# Patient Record
Sex: MALE | Race: BLACK OR AFRICAN AMERICAN | NOT HISPANIC OR LATINO | URBAN - NONMETROPOLITAN AREA
[De-identification: names, ages, dates, MRNs, and addresses within clinical notes are randomized per-mention and may not be internally consistent; named-entity substitution may affect disease eponyms.]

---

## 2021-04-26 ENCOUNTER — CLAIMS CREATED FROM THE CLAIM WINDOW (OUTPATIENT)
Dept: URBAN - NONMETROPOLITAN AREA CLINIC 2 | Facility: CLINIC | Age: 66
End: 2021-04-26
Payer: MEDICARE

## 2021-04-26 ENCOUNTER — LAB OUTSIDE AN ENCOUNTER (OUTPATIENT)
Dept: URBAN - NONMETROPOLITAN AREA CLINIC 2 | Facility: CLINIC | Age: 66
End: 2021-04-26

## 2021-04-26 VITALS
SYSTOLIC BLOOD PRESSURE: 152 MMHG | HEIGHT: 67 IN | DIASTOLIC BLOOD PRESSURE: 77 MMHG | BODY MASS INDEX: 26.46 KG/M2 | HEART RATE: 81 BPM | TEMPERATURE: 97.2 F | WEIGHT: 168.6 LBS

## 2021-04-26 DIAGNOSIS — D69.6 THROMBOCYTOPENIA: ICD-10-CM

## 2021-04-26 DIAGNOSIS — R18.8 ASCITES OF LIVER: ICD-10-CM

## 2021-04-26 DIAGNOSIS — Z12.11 COLON CANCER SCREENING: ICD-10-CM

## 2021-04-26 DIAGNOSIS — D64.9 ANEMIA, UNSPECIFIED TYPE: ICD-10-CM

## 2021-04-26 PROCEDURE — 99204 OFFICE O/P NEW MOD 45 MIN: CPT | Performed by: INTERNAL MEDICINE

## 2021-04-26 PROCEDURE — 99244 OFF/OP CNSLTJ NEW/EST MOD 40: CPT | Performed by: INTERNAL MEDICINE

## 2021-04-26 NOTE — HPI-TODAY'S VISIT:
Mr. Clark is referred to us for consultation of Ascites by Dr. Jose, a copy of this note will be sent to the referring MD. He developed ascites in December of unknown etiology, no serum or serologic albumin was drawn to check a Saag. I can't tell why this was ordered but likely from previous nephrologist (not Dr. Jose). He does drink occasionally 1-2 beers a month socially, not on a daily or weekly basis per his report. He has no history of IVD use or unprofessional tatoos. he has had negative hepatitis B/C testing with HD. He has no history of liver disease in his family, no jaunidce or hepatitis as a kid. He was noted on labs done with the paracentesis to have low platelets as well. He has not had any abdominal imaging. He has chronic anemia on HD due to ESRD from HTN. He had a colonoscopy and possible EGD done 3 years ago by Dr. Cabezas. He does not recall the results, we don't have these records. Today he is doing well otherwise and here to establish care. MB

## 2021-04-29 LAB
A/G RATIO: 1.1
ACTIN (SMOOTH MUSCLE) ANTIBODY: 19
ALBUMIN: 4
ALKALINE PHOSPHATASE: 176
ALPHA-1-ANTITRYPSIN, SERUM: 186
ALT (SGPT): 12
ANA DIRECT: NEGATIVE
AST (SGOT): 16
BASO (ABSOLUTE): 0
BASOS: 1
BILIRUBIN, TOTAL: 0.9
BUN/CREATININE RATIO: 4
BUN: 30
CALCIUM: 9.2
CARBON DIOXIDE, TOTAL: 24
CHLORIDE: 93
CREATININE: 8.43
EGFR IF AFRICN AM: 7
EGFR IF NONAFRICN AM: 6
ENDOMYSIAL ANTIBODY IGA: NEGATIVE
EOS (ABSOLUTE): 0
EOS: 2
FERRITIN, SERUM: 1762
GGT: 341
GLOBULIN, TOTAL: 3.7
GLUCOSE: 81
HBSAG SCREEN: NEGATIVE
HEMATOCRIT: 31.6
HEMATOLOGY COMMENTS:: (no result)
HEMOGLOBIN: 10.2
HEP A AB, IGM: NEGATIVE
HEP B CORE AB, IGM: NEGATIVE
HEP C VIRUS AB: <0.1
IMMATURE CELLS: (no result)
IMMATURE GRANS (ABS): 0
IMMATURE GRANULOCYTES: 1
IMMUNOGLOBULIN A, QN, SERUM: 496
IRON BIND.CAP.(TIBC): 232
IRON SATURATION: 37
IRON: 85
LYMPHS (ABSOLUTE): 0.5
LYMPHS: 25
MCH: 29.7
MCHC: 32.3
MCV: 92
MITOCHONDRIAL (M2) ANTIBODY: <20
MONOCYTES(ABSOLUTE): 0.3
MONOCYTES: 13
NEUTROPHILS (ABSOLUTE): 1.3
NEUTROPHILS: 58
NRBC: (no result)
PLATELETS: 103
POTASSIUM: 4.3
PROTEIN, TOTAL: 7.7
RBC: 3.44
RDW: 14.5
SODIUM: 137
T-TRANSGLUTAMINASE (TTG) IGA: <2
TSH: 3.48
UIBC: 147
WBC: 2.2

## 2021-05-03 ENCOUNTER — TELEPHONE ENCOUNTER (OUTPATIENT)
Dept: URBAN - METROPOLITAN AREA CLINIC 92 | Facility: CLINIC | Age: 66
End: 2021-05-03

## 2021-05-13 ENCOUNTER — TELEPHONE ENCOUNTER (OUTPATIENT)
Dept: URBAN - METROPOLITAN AREA CLINIC 92 | Facility: CLINIC | Age: 66
End: 2021-05-13

## 2021-06-03 LAB — HEREDITARY  HEMOCHROMATOSIS: (no result)

## 2021-07-26 ENCOUNTER — WEB ENCOUNTER (OUTPATIENT)
Dept: URBAN - NONMETROPOLITAN AREA CLINIC 2 | Facility: CLINIC | Age: 66
End: 2021-07-26

## 2021-07-26 ENCOUNTER — LAB OUTSIDE AN ENCOUNTER (OUTPATIENT)
Dept: URBAN - NONMETROPOLITAN AREA CLINIC 2 | Facility: CLINIC | Age: 66
End: 2021-07-26

## 2021-07-26 ENCOUNTER — OFFICE VISIT (OUTPATIENT)
Dept: URBAN - NONMETROPOLITAN AREA CLINIC 2 | Facility: CLINIC | Age: 66
End: 2021-07-26
Payer: MEDICARE

## 2021-07-26 VITALS
BODY MASS INDEX: 25.9 KG/M2 | SYSTOLIC BLOOD PRESSURE: 153 MMHG | DIASTOLIC BLOOD PRESSURE: 79 MMHG | HEART RATE: 86 BPM | HEIGHT: 67 IN | TEMPERATURE: 97.6 F | WEIGHT: 165 LBS

## 2021-07-26 DIAGNOSIS — Z12.11 COLON CANCER SCREENING: ICD-10-CM

## 2021-07-26 DIAGNOSIS — D64.89 ANEMIA DUE TO OTHER CAUSE: ICD-10-CM

## 2021-07-26 DIAGNOSIS — R79.89 ELEVATED FERRITIN: ICD-10-CM

## 2021-07-26 DIAGNOSIS — D69.6 THROMBOCYTOPENIA: ICD-10-CM

## 2021-07-26 DIAGNOSIS — K74.69 CIRRHOSIS, CRYPTOGENIC: ICD-10-CM

## 2021-07-26 DIAGNOSIS — R18.8 ASCITES OF LIVER: ICD-10-CM

## 2021-07-26 PROCEDURE — 99214 OFFICE O/P EST MOD 30 MIN: CPT | Performed by: INTERNAL MEDICINE

## 2021-07-26 NOTE — HPI-TODAY'S VISIT:
Mr. Clark is referred to us for consultation of Ascites by Dr. Jose, a copy of this note will be sent to the referring MD. He developed ascites in December of unknown etiology, no serum or serologic albumin was drawn to check a Saag. I can't tell why this was ordered but likely from previous nephrologist (not Dr. Jose). He does drink occasionally 1-2 beers a month socially, not on a daily or weekly basis per his report. He has no history of IVD use or unprofessional tatoos. he has had negative hepatitis B/C testing with HD. He has no history of liver disease in his family, no jaunidce or hepatitis as a kid. He was noted on labs done with the paracentesis to have low platelets as well. He has not had any abdominal imaging. He has chronic anemia on HD due to ESRD from HTN. He had a colonoscopy and possible EGD done 3 years ago by Dr. Cabezas. He does not recall the results, we don't have these records. Today he is doing well otherwise and here to establish care. MB 7/26/2021 Mr. Clark presents for follow-up of ascites and a new diagnosis of cirrhosis.  Since his last visit his ultrasound does reveal an cirrhotic appearing liver.  His chronic serologies are normal except for an elevated ferritin.  He does remain on peritoneal dialysis.  His last EGD and colonoscopy were done over 3 years ago by Dr. Cabezas, we were unable to get these results.  He does have pancytopenia with mild anemia leukopenia, along with thrombocytopenia.  He is following with Dr. Issa on peritoneal dialysis with no significant accumulation of his ascites.  Today we had a long discussion regarding liver disease, he is not a drinker.  He does agree to EGD and colonoscopy for evaluation of his anemia.  All of his questions were answered.  MB

## 2021-07-30 LAB
A/G RATIO: 1.3
ALBUMIN: 4
ALKALINE PHOSPHATASE: 171
ALT (SGPT): 10
AST (SGOT): 15
BANDS: (no result)
BASO (ABSOLUTE): 0
BASOS: 1
BILIRUBIN, TOTAL: 0.9
BLASTS/BLAST LIKE CELLS: (no result)
BUN/CREATININE RATIO: 4
BUN: 35
CALCIUM: 9.2
CARBON DIOXIDE, TOTAL: 26
CHLORIDE: 95
CREATININE: 9.08
EGFR IF AFRICN AM: 6
EGFR IF NONAFRICN AM: 5
EOS (ABSOLUTE): 0.1
EOS: 4
GLOBULIN, TOTAL: 3
GLUCOSE: 93
HEMATOCRIT: 28.7
HEMATOLOGY COMMENTS:: (no result)
HEMOGLOBIN: 9.8
HEREDITARY  HEMOCHROMATOSIS: (no result)
IMMATURE CELLS: (no result)
IMMATURE GRANS (ABS): (no result)
IMMATURE GRANULOCYTES: (no result)
INR: 1.1
LYMPHS (ABSOLUTE): 0.4
LYMPHS: 19
MCH: 30.5
MCHC: 34.1
MCV: 89
MEGAKARYOCYTES: (no result)
METAMYELOCYTES: (no result)
MONOCYTES(ABSOLUTE): 0.3
MONOCYTES: 13
MYELOCYTES: 1
NEUTROPHILS (ABSOLUTE): 1.2
NEUTROPHILS: 62
NRBC: (no result)
OTHER, LINEAGE UNCERTAIN: (no result)
PLATELETS: 79
POTASSIUM: 4.2
PROMYELOCYTES: (no result)
PROTEIN, TOTAL: 7
PROTHROMBIN TIME: 11.3
RBC: 3.21
RDW: 14
SODIUM: 137
WBC: 2

## 2021-09-09 ENCOUNTER — OFFICE VISIT (OUTPATIENT)
Dept: URBAN - METROPOLITAN AREA MEDICAL CENTER 1 | Facility: MEDICAL CENTER | Age: 66
End: 2021-09-09
Payer: MEDICARE

## 2021-09-09 DIAGNOSIS — K29.60 ADENOPAPILLOMATOSIS GASTRICA: ICD-10-CM

## 2021-09-09 DIAGNOSIS — D12.5 ADENOMA OF SIGMOID COLON: ICD-10-CM

## 2021-09-09 DIAGNOSIS — D50.9 ANEMIA: ICD-10-CM

## 2021-09-09 DIAGNOSIS — D12.2 ADENOMA OF ASCENDING COLON: ICD-10-CM

## 2021-09-09 DIAGNOSIS — D12.0 ADENOMA OF CECUM: ICD-10-CM

## 2021-09-09 DIAGNOSIS — K74.69 CIRRHOSIS, CRYPTOGENIC: ICD-10-CM

## 2021-09-09 PROCEDURE — 45380 COLONOSCOPY AND BIOPSY: CPT | Performed by: INTERNAL MEDICINE

## 2021-09-09 PROCEDURE — 43239 EGD BIOPSY SINGLE/MULTIPLE: CPT | Performed by: INTERNAL MEDICINE

## 2021-09-09 PROCEDURE — 45385 COLONOSCOPY W/LESION REMOVAL: CPT | Performed by: INTERNAL MEDICINE

## 2021-10-14 ENCOUNTER — OFFICE VISIT (OUTPATIENT)
Dept: URBAN - NONMETROPOLITAN AREA CLINIC 2 | Facility: CLINIC | Age: 66
End: 2021-10-14
Payer: MEDICARE

## 2021-10-14 ENCOUNTER — LAB OUTSIDE AN ENCOUNTER (OUTPATIENT)
Dept: URBAN - NONMETROPOLITAN AREA CLINIC 2 | Facility: CLINIC | Age: 66
End: 2021-10-14

## 2021-10-14 VITALS
TEMPERATURE: 97.7 F | WEIGHT: 165.2 LBS | BODY MASS INDEX: 25.93 KG/M2 | HEART RATE: 89 BPM | HEIGHT: 67 IN | SYSTOLIC BLOOD PRESSURE: 162 MMHG | DIASTOLIC BLOOD PRESSURE: 80 MMHG

## 2021-10-14 DIAGNOSIS — D64.9 ANEMIA, UNSPECIFIED TYPE: ICD-10-CM

## 2021-10-14 DIAGNOSIS — R18.8 ASCITES OF LIVER: ICD-10-CM

## 2021-10-14 DIAGNOSIS — Z12.11 COLON CANCER SCREENING: ICD-10-CM

## 2021-10-14 DIAGNOSIS — R79.89 ELEVATED FERRITIN: ICD-10-CM

## 2021-10-14 DIAGNOSIS — K74.60 UNSPECIFIED CIRRHOSIS OF LIVER: ICD-10-CM

## 2021-10-14 DIAGNOSIS — D69.6 THROMBOCYTOPENIA: ICD-10-CM

## 2021-10-14 PROCEDURE — 99214 OFFICE O/P EST MOD 30 MIN: CPT | Performed by: NURSE PRACTITIONER

## 2021-10-14 NOTE — HPI-TODAY'S VISIT:
Mr. Clark is referred to us for consultation of Ascites by Dr. Jose, a copy of this note will be sent to the referring MD. He developed ascites in December of unknown etiology, no serum or serologic albumin was drawn to check a Saag. I can't tell why this was ordered but likely from previous nephrologist (not Dr. Jose). He does drink occasionally 1-2 beers a month socially, not on a daily or weekly basis per his report. He has no history of IVD use or unprofessional tatoos. he has had negative hepatitis B/C testing with HD. He has no history of liver disease in his family, no jaunidce or hepatitis as a kid. He was noted on labs done with the paracentesis to have low platelets as well. He has not had any abdominal imaging. He has chronic anemia on HD due to ESRD from HTN. He had a colonoscopy and possible EGD done 3 years ago by Dr. Cabezas. He does not recall the results, we don't have these records. Today he is doing well otherwise and here to establish care. MB 7/26/2021 Mr. Clark presents for follow-up of ascites and a new diagnosis of cirrhosis.  Since his last visit his ultrasound does reveal an cirrhotic appearing liver.  His chronic serologies are normal except for an elevated ferritin.  He does remain on peritoneal dialysis.  His last EGD and colonoscopy were done over 3 years ago by Dr. Cabezas, we were unable to get these results.  He does have pancytopenia with mild anemia leukopenia, along with thrombocytopenia.  He is following with Dr. Issa on peritoneal dialysis with no significant accumulation of his ascites.  Today we had a long discussion regarding liver disease, he is not a drinker.  He does agree to EGD and colonoscopy for evaluation of his anemia.  All of his questions were answered.  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 Since his last visit he is status post EGD with no esophageal varices, his colonoscopy revealed multiple large TA/TVA's. He is due for repeat colonoscopy in 1 year. His blood work does show mild anemia which is stable on peritoneal dialysis. His ALP is mildly elevated also likely secondary to his end-stage renal disease. Today he has no GI complaints, he is tolerating his peritoneal dialysis daily. His meld is low, and he is doing well today. MB 10/14/2021 Ben presents for endoscopy and colonoscopy follow-up. His EGD reveals no esophageal varices or portal hypertension. His colonoscopy reveals multiple tubular adenomas and tubulovillous adenomas. He is due for repeat surveillance colonoscopy in 1 year. His bowels are moving regularly, he has no symptoms of hepatic encephalopathy. His labs show normocytic anemia secondary to his end-stage renal disease along with a mildly elevated alkaline phosphatase. He is due for repeat HCC screening, he has a low meld. Today he is doing well otherwise with no new GI complaints. MB

## 2021-10-15 ENCOUNTER — TELEPHONE ENCOUNTER (OUTPATIENT)
Dept: URBAN - METROPOLITAN AREA CLINIC 92 | Facility: CLINIC | Age: 66
End: 2021-10-15

## 2021-10-15 LAB
A/G RATIO: 1.2
AFP, SERUM, TUMOR MARKER: 3.1
ALBUMIN: 4.4
ALKALINE PHOSPHATASE: 197
ALT (SGPT): 15
AST (SGOT): 21
BASO (ABSOLUTE): 0
BASOS: 1
BILIRUBIN, TOTAL: 1.1
BUN/CREATININE RATIO: 2
BUN: 11
CALCIUM: 9.2
CARBON DIOXIDE, TOTAL: 32
CHLORIDE: 94
CREATININE: 4.63
EGFR IF AFRICN AM: 14
EGFR IF NONAFRICN AM: 12
EOS (ABSOLUTE): 0
EOS: 2
GLOBULIN, TOTAL: 3.6
GLUCOSE: 84
HEMATOCRIT: 31.4
HEMATOLOGY COMMENTS:: (no result)
HEMOGLOBIN: 10.3
IMMATURE CELLS: (no result)
IMMATURE GRANS (ABS): 0
IMMATURE GRANULOCYTES: 1
INR: 1.1
LYMPHS (ABSOLUTE): 0.4
LYMPHS: 19
MCH: 30.1
MCHC: 32.8
MCV: 92
MONOCYTES(ABSOLUTE): 0.4
MONOCYTES: 18
NEUTROPHILS (ABSOLUTE): 1.1
NEUTROPHILS: 59
NRBC: (no result)
PLATELETS: 114
POTASSIUM: 3.8
PROTEIN, TOTAL: 8
PROTHROMBIN TIME: 11.2
RBC: 3.42
RDW: 14.4
SODIUM: 139
WBC: 1.9

## 2021-10-18 ENCOUNTER — TELEPHONE ENCOUNTER (OUTPATIENT)
Dept: URBAN - NONMETROPOLITAN AREA CLINIC 2 | Facility: CLINIC | Age: 66
End: 2021-10-18

## 2021-11-10 ENCOUNTER — TELEPHONE ENCOUNTER (OUTPATIENT)
Dept: URBAN - METROPOLITAN AREA CLINIC 92 | Facility: CLINIC | Age: 66
End: 2021-11-10

## 2021-11-10 ENCOUNTER — LAB OUTSIDE AN ENCOUNTER (OUTPATIENT)
Dept: URBAN - METROPOLITAN AREA CLINIC 92 | Facility: CLINIC | Age: 66
End: 2021-11-10

## 2021-12-09 ENCOUNTER — TELEPHONE ENCOUNTER (OUTPATIENT)
Dept: URBAN - METROPOLITAN AREA CLINIC 92 | Facility: CLINIC | Age: 66
End: 2021-12-09

## 2022-04-14 ENCOUNTER — OFFICE VISIT (OUTPATIENT)
Dept: URBAN - NONMETROPOLITAN AREA CLINIC 2 | Facility: CLINIC | Age: 67
End: 2022-04-14

## 2022-05-16 ENCOUNTER — TELEPHONE ENCOUNTER (OUTPATIENT)
Dept: URBAN - NONMETROPOLITAN AREA CLINIC 2 | Facility: CLINIC | Age: 67
End: 2022-05-16

## 2022-05-16 ENCOUNTER — LAB OUTSIDE AN ENCOUNTER (OUTPATIENT)
Dept: URBAN - NONMETROPOLITAN AREA CLINIC 2 | Facility: CLINIC | Age: 67
End: 2022-05-16

## 2022-05-16 ENCOUNTER — OFFICE VISIT (OUTPATIENT)
Dept: URBAN - NONMETROPOLITAN AREA CLINIC 2 | Facility: CLINIC | Age: 67
End: 2022-05-16
Payer: MEDICARE

## 2022-05-16 VITALS
BODY MASS INDEX: 27.69 KG/M2 | WEIGHT: 176.4 LBS | HEART RATE: 97.6 BPM | RESPIRATION RATE: 85 BRPM | SYSTOLIC BLOOD PRESSURE: 149 MMHG | HEIGHT: 67 IN | DIASTOLIC BLOOD PRESSURE: 79 MMHG | TEMPERATURE: 97.5 F

## 2022-05-16 DIAGNOSIS — R18.8 ASCITES OF LIVER: ICD-10-CM

## 2022-05-16 DIAGNOSIS — K82.8 THICKENING OF WALL OF GALLBLADDER: ICD-10-CM

## 2022-05-16 DIAGNOSIS — D69.6 THROMBOCYTOPENIA: ICD-10-CM

## 2022-05-16 DIAGNOSIS — D64.9 ANEMIA, UNSPECIFIED TYPE: ICD-10-CM

## 2022-05-16 DIAGNOSIS — R59.1 LYMPHADENOPATHY: ICD-10-CM

## 2022-05-16 DIAGNOSIS — Z12.11 COLON CANCER SCREENING: ICD-10-CM

## 2022-05-16 DIAGNOSIS — D64.89 ANEMIA DUE TO OTHER CAUSE: ICD-10-CM

## 2022-05-16 DIAGNOSIS — K74.69 CIRRHOSIS, CRYPTOGENIC: ICD-10-CM

## 2022-05-16 DIAGNOSIS — D61.818 PANCYTOPENIA: ICD-10-CM

## 2022-05-16 DIAGNOSIS — R79.89 ELEVATED FERRITIN: ICD-10-CM

## 2022-05-16 PROCEDURE — 99214 OFFICE O/P EST MOD 30 MIN: CPT | Performed by: NURSE PRACTITIONER

## 2022-05-16 NOTE — HPI-TODAY'S VISIT:
Mr. Clark is referred to us for consultation of Ascites by Dr. Jose, a copy of this note will be sent to the referring MD. He developed ascites in December of unknown etiology, no serum or serologic albumin was drawn to check a Saag. I can't tell why this was ordered but likely from previous nephrologist (not Dr. Jose). He does drink occasionally 1-2 beers a month socially, not on a daily or weekly basis per his report. He has no history of IVD use or unprofessional tatoos. he has had negative hepatitis B/C testing with HD. He has no history of liver disease in his family, no jaunidce or hepatitis as a kid. He was noted on labs done with the paracentesis to have low platelets as well. He has not had any abdominal imaging. He has chronic anemia on HD due to ESRD from HTN. He had a colonoscopy and possible EGD done 3 years ago by Dr. Cabezas. He does not recall the results, we don't have these records. Today he is doing well otherwise and here to establish care. MB 7/26/2021 Mr. Clark presents for follow-up of ascites and a new diagnosis of cirrhosis.  Since his last visit his ultrasound does reveal an cirrhotic appearing liver.  His chronic serologies are normal except for an elevated ferritin.  He does remain on peritoneal dialysis.  His last EGD and colonoscopy were done over 3 years ago by Dr. Cabezas, we were unable to get these results.  He does have pancytopenia with mild anemia leukopenia, along with thrombocytopenia.  He is following with Dr. Issa on peritoneal dialysis with no significant accumulation of his ascites.  Today we had a long discussion regarding liver disease, he is not a drinker.  He does agree to EGD and colonoscopy for evaluation of his anemia.  All of his questions were answered.  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 Since his last visit he is status post EGD with no esophageal varices, his colonoscopy revealed multiple large TA/TVA's. He is due for repeat colonoscopy in 1 year. His blood work does show mild anemia which is stable on peritoneal dialysis. His ALP is mildly elevated also likely secondary to his end-stage renal disease. Today he has no GI complaints, he is tolerating his peritoneal dialysis daily. His meld is low, and he is doing well today. MB 10/14/2021 Ben presents for endoscopy and colonoscopy follow-up. His EGD reveals no esophageal varices or portal hypertension. His colonoscopy reveals multiple tubular adenomas and tubulovillous adenomas. He is due for repeat surveillance colonoscopy in 1 year. His bowels are moving regularly, he has no symptoms of hepatic encephalopathy. His labs show normocytic anemia secondary to his end-stage renal disease along with a mildly elevated alkaline phosphatase. He is due for repeat HCC screening, he has a low meld. Today he is doing well otherwise with no new GI complaints. 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  Since his last visit his ultrasound showed gallbladder wall thickening and periportal edema with a Jonah pancreatic lymph node.  He had an MRCP that was normal.  He has been stable with his peritoneal dialysis following with Dr. Issa.  His last colonoscopy was in September 2021 with multiple large tubular adenoma/tubulovillous adenomas by Dr. Omalley.  He is due for repeat this fall.  He denies any nausea vomiting or melena.  He does have pancytopenia likely secondary to his end-stage renal disease and end-stage liver disease.  He is due for repeat labs today.  Today he has no new GI complaints.  He agrees to repeat colonoscopy this fall.  MB

## 2022-05-17 LAB
A/G RATIO: 1.1
ALBUMIN: 4
ALKALINE PHOSPHATASE: 163
ALT (SGPT): 9
AST (SGOT): 18
BASO (ABSOLUTE): 0.1
BASOS: 3
BILIRUBIN, TOTAL: 0.9
BUN/CREATININE RATIO: 4
BUN: 35
CALCIUM: 9.1
CARBON DIOXIDE, TOTAL: 28
CHLORIDE: 92
CREATININE: 8.29
EGFR: 7
EOS (ABSOLUTE): 0
EOS: 2
GLOBULIN, TOTAL: 3.5
GLUCOSE: 76
HEMATOCRIT: 32.7
HEMATOLOGY COMMENTS:: (no result)
HEMOGLOBIN: 10.8
IMMATURE CELLS: (no result)
IMMATURE GRANS (ABS): (no result)
IMMATURE GRANULOCYTES: (no result)
INR: 1.1
LYMPHS (ABSOLUTE): 0.4
LYMPHS: 23
MCH: 30.2
MCHC: 33
MCV: 91
MONOCYTES(ABSOLUTE): 0.3
MONOCYTES: 15
NEUTROPHILS (ABSOLUTE): 1.1
NEUTROPHILS: 57
NRBC: (no result)
PLATELETS: 95
POTASSIUM: 4.3
PROTEIN, TOTAL: 7.5
PROTHROMBIN TIME: 11.2
RBC: 3.58
RDW: 14.2
SODIUM: 135
WBC: 1.9

## 2022-05-18 ENCOUNTER — TELEPHONE ENCOUNTER (OUTPATIENT)
Dept: URBAN - METROPOLITAN AREA CLINIC 92 | Facility: CLINIC | Age: 67
End: 2022-05-18

## 2022-09-12 ENCOUNTER — OFFICE VISIT (OUTPATIENT)
Dept: URBAN - METROPOLITAN AREA MEDICAL CENTER 1 | Facility: MEDICAL CENTER | Age: 67
End: 2022-09-12
Payer: MEDICARE

## 2022-09-12 DIAGNOSIS — Z86.010 ADENOMAS PERSONAL HISTORY OF COLONIC POLYPS: ICD-10-CM

## 2022-09-12 PROCEDURE — G0105 COLORECTAL SCRN; HI RISK IND: HCPCS | Performed by: INTERNAL MEDICINE

## 2022-10-10 ENCOUNTER — DASHBOARD ENCOUNTERS (OUTPATIENT)
Age: 67
End: 2022-10-10

## 2022-10-10 ENCOUNTER — OFFICE VISIT (OUTPATIENT)
Dept: URBAN - NONMETROPOLITAN AREA CLINIC 2 | Facility: CLINIC | Age: 67
End: 2022-10-10
Payer: MEDICARE

## 2022-10-10 ENCOUNTER — LAB OUTSIDE AN ENCOUNTER (OUTPATIENT)
Dept: URBAN - NONMETROPOLITAN AREA CLINIC 2 | Facility: CLINIC | Age: 67
End: 2022-10-10

## 2022-10-10 VITALS
HEIGHT: 67 IN | WEIGHT: 177.2 LBS | SYSTOLIC BLOOD PRESSURE: 167 MMHG | BODY MASS INDEX: 27.81 KG/M2 | HEART RATE: 96 BPM | DIASTOLIC BLOOD PRESSURE: 88 MMHG | TEMPERATURE: 97.5 F

## 2022-10-10 DIAGNOSIS — R59.1 LYMPHADENOPATHY: ICD-10-CM

## 2022-10-10 DIAGNOSIS — D69.6 THROMBOCYTOPENIA: ICD-10-CM

## 2022-10-10 DIAGNOSIS — K74.69 CIRRHOSIS, CRYPTOGENIC: ICD-10-CM

## 2022-10-10 DIAGNOSIS — Z12.11 COLON CANCER SCREENING: ICD-10-CM

## 2022-10-10 DIAGNOSIS — D61.818 PANCYTOPENIA: ICD-10-CM

## 2022-10-10 DIAGNOSIS — R18.8 ASCITES OF LIVER: ICD-10-CM

## 2022-10-10 DIAGNOSIS — K82.8 THICKENING OF WALL OF GALLBLADDER: ICD-10-CM

## 2022-10-10 DIAGNOSIS — D72.819 LEUKOPENIA, UNSPECIFIED TYPE: ICD-10-CM

## 2022-10-10 DIAGNOSIS — D50.8 ACQUIRED IRON DEFICIENCY ANEMIA DUE TO DECREASED ABSORPTION: ICD-10-CM

## 2022-10-10 PROBLEM — 305058001: Status: ACTIVE | Noted: 2021-04-26

## 2022-10-10 PROBLEM — 19943007: Status: ACTIVE | Noted: 2021-07-26

## 2022-10-10 PROBLEM — 127034005: Status: ACTIVE | Noted: 2021-10-15

## 2022-10-10 PROBLEM — 30746006: Status: ACTIVE | Noted: 2021-11-10

## 2022-10-10 PROBLEM — 415116008: Status: ACTIVE | Noted: 2021-04-26

## 2022-10-10 PROBLEM — 84828003: Status: ACTIVE | Noted: 2022-05-18

## 2022-10-10 PROBLEM — 271737000: Status: ACTIVE | Noted: 2021-04-26

## 2022-10-10 PROBLEM — 365799007: Status: ACTIVE | Noted: 2021-05-03

## 2022-10-10 PROBLEM — 300354009: Status: ACTIVE | Noted: 2021-11-10

## 2022-10-10 PROBLEM — 236004002: Status: ACTIVE | Noted: 2021-04-26

## 2022-10-10 PROCEDURE — 99214 OFFICE O/P EST MOD 30 MIN: CPT | Performed by: NURSE PRACTITIONER

## 2022-10-10 NOTE — HPI-TODAY'S VISIT:
10/10/2022 Mr. Clark presents for follow-up of end-stage liver disease likely secondary to Rogel, leukopenia, anemia, thrombocytopenia, ascites, on hemodialysis now off peritoneal dialysis followed by Dr. Issa.  Since his last visit his repeat colonoscopy done by Dr. Almeida in September shows no recurrent polyps.  His EGD in 2021 reveals no esophageal varices.  He is now off peritoneal dialysis and on hemodialysis.  He has had some abdominal accumulation with ascites likely secondary to drinking Gatorade while working, he is a  at [employer].  Today we have discussed at length his low-sodium diet.  He is an uric on hemodialysis.  He states that his abdominal ascites improves after dialysis but builds over the weekend and that is typically when he works.  He is due for HCC screening, his last imaging was done in December 2021 with MRI given his peripancreatic changes found on ultrasound.  He does have polycystic kidneys on imaging.  We have faxed that to nephrology along with urology for follow-up.  He follows with Dr. Velazquez for his pancytopenia likely secondary to his end-stage renal disease and end-stage liver disease.  Today he is doing fairly well otherwise with no new GI complaints.  MB

## 2022-10-11 ENCOUNTER — TELEPHONE ENCOUNTER (OUTPATIENT)
Dept: URBAN - NONMETROPOLITAN AREA CLINIC 2 | Facility: CLINIC | Age: 67
End: 2022-10-11

## 2022-10-12 ENCOUNTER — TELEPHONE ENCOUNTER (OUTPATIENT)
Dept: URBAN - METROPOLITAN AREA CLINIC 92 | Facility: CLINIC | Age: 67
End: 2022-10-12

## 2022-10-12 LAB
A/G RATIO: 0.8
ABSOLUTE BASOPHILS: 20
ABSOLUTE EOSINOPHILS: 20
ABSOLUTE LYMPHOCYTES: 386
ABSOLUTE MONOCYTES: 247
ABSOLUTE NEUTROPHILS: 1027
ALBUMIN: 3.1
ALKALINE PHOSPHATASE: 161
ALT (SGPT): 7
AST (SGOT): 17
BASOPHILS: 1.2
BILIRUBIN, TOTAL: 1.2
BUN/CREATININE RATIO: 4
BUN: 37
CALCIUM: 8.7
CARBON DIOXIDE, TOTAL: 28
CHLORIDE: 93
COMMENT(S): (no result)
CREATININE: 8.87
EGFR: 6
EOSINOPHILS: 1.2
GLOBULIN, TOTAL: 3.9
GLUCOSE: 88
HEMATOCRIT: 29.8
HEMOGLOBIN: 9.8
INR: 1.1
LYMPHOCYTES: 22.7
MCH: 29.4
MCHC: 32.9
MCV: 89.5
MONOCYTES: 14.5
MPV: 9.3
NEUTROPHILS: 60.4
PLATELET COUNT: 95
POTASSIUM: 4.1
PROTEIN, TOTAL: 7
PT: 10.9
RDW: 14.3
RED BLOOD CELL COUNT: 3.33
SODIUM: 134
WHITE BLOOD CELL COUNT: 1.7

## 2022-11-02 ENCOUNTER — TELEPHONE ENCOUNTER (OUTPATIENT)
Dept: URBAN - METROPOLITAN AREA CLINIC 92 | Facility: CLINIC | Age: 67
End: 2022-11-02

## 2023-01-16 ENCOUNTER — OUT OF OFFICE VISIT (OUTPATIENT)
Dept: URBAN - METROPOLITAN AREA MEDICAL CENTER 1 | Facility: MEDICAL CENTER | Age: 68
End: 2023-01-16
Payer: MEDICARE

## 2023-01-16 DIAGNOSIS — K92.0 BLOODY EMESIS: ICD-10-CM

## 2023-01-16 DIAGNOSIS — R18.8 ABDOMINAL ASCITES: ICD-10-CM

## 2023-01-16 DIAGNOSIS — K74.69 CIRRHOSIS, CRYPTOGENIC: ICD-10-CM

## 2023-01-16 PROCEDURE — 99232 SBSQ HOSP IP/OBS MODERATE 35: CPT

## 2023-01-17 ENCOUNTER — OUT OF OFFICE VISIT (OUTPATIENT)
Dept: URBAN - METROPOLITAN AREA MEDICAL CENTER 1 | Facility: MEDICAL CENTER | Age: 68
End: 2023-01-17
Payer: MEDICARE

## 2023-01-17 DIAGNOSIS — K92.0 BLOODY EMESIS: ICD-10-CM

## 2023-01-17 PROCEDURE — 43235 EGD DIAGNOSTIC BRUSH WASH: CPT | Performed by: INTERNAL MEDICINE

## 2023-01-18 ENCOUNTER — LAB OUTSIDE AN ENCOUNTER (OUTPATIENT)
Dept: URBAN - NONMETROPOLITAN AREA CLINIC 2 | Facility: CLINIC | Age: 68
End: 2023-01-18

## 2023-01-18 LAB — FECAL OCCULT BLOOD: NEGATIVE

## 2023-04-17 ENCOUNTER — OFFICE VISIT (OUTPATIENT)
Dept: URBAN - NONMETROPOLITAN AREA CLINIC 2 | Facility: CLINIC | Age: 68
End: 2023-04-17